# Patient Record
Sex: FEMALE | Race: WHITE | URBAN - METROPOLITAN AREA
[De-identification: names, ages, dates, MRNs, and addresses within clinical notes are randomized per-mention and may not be internally consistent; named-entity substitution may affect disease eponyms.]

---

## 2022-11-15 ENCOUNTER — TELEPHONE (OUTPATIENT)
Dept: ENDOCRINOLOGY | Age: 62
End: 2022-11-15

## 2022-12-05 ENCOUNTER — TELEPHONE (OUTPATIENT)
Dept: ENDOCRINOLOGY | Age: 62
End: 2022-12-05

## 2022-12-15 ENCOUNTER — TELEPHONE (OUTPATIENT)
Dept: ENDOCRINOLOGY | Age: 62
End: 2022-12-15

## 2022-12-20 NOTE — TELEPHONE ENCOUNTER
Patient is scheduled for a NP appointment with Dr. Chris Rosado on 2/22/23 @ 9:00 am.      Patient has been advised to bring any vitamins and/or supplements to the appointment.

## 2023-02-22 ENCOUNTER — OFFICE VISIT (OUTPATIENT)
Dept: ENDOCRINOLOGY | Age: 63
End: 2023-02-22
Payer: COMMERCIAL

## 2023-02-22 VITALS
SYSTOLIC BLOOD PRESSURE: 125 MMHG | WEIGHT: 120.6 LBS | HEIGHT: 65 IN | DIASTOLIC BLOOD PRESSURE: 79 MMHG | BODY MASS INDEX: 20.09 KG/M2

## 2023-02-22 DIAGNOSIS — M81.0 OSTEOPOROSIS, POSTMENOPAUSAL: Primary | ICD-10-CM

## 2023-02-22 PROCEDURE — 99205 OFFICE O/P NEW HI 60 MIN: CPT | Performed by: INTERNAL MEDICINE

## 2023-02-22 RX ORDER — MULTIVITAMIN
1 TABLET ORAL DAILY
COMMUNITY

## 2023-02-22 RX ORDER — CALCIUM CARBONATE 500(1250)
TABLET ORAL DAILY
COMMUNITY

## 2023-02-22 RX ORDER — CALCIUM CARBONATE/VITAMIN D3 500-10/5ML
1 LIQUID (ML) ORAL DAILY
COMMUNITY

## 2023-02-22 NOTE — PROGRESS NOTES
[facility] ([facility]) Osteoporosis and [address] [facility]  [facility] [address] [facility], [address],[place] [address]  Phone [phone]  Fax [phone]    NAME:  Robby Galvin  :  1960  CONSULT DATE:    2023  MOST RECENT VISIT:  2023  TODAY'S DATE:  2023    Labs @ [facility] 2022    CONSULTATION REQUESTED BY: Jaime Pederson MD, fax [phone]  OTHERS WHO NEED REPORTS: Dayanna Edwards MD, fax [phone]    PROBLEMS. Osteoporosis by DXA 2016, T-scores -2.6 in the spine, -2.0 in the left femoral neck    Family history of osteoporosis, two sisters; maternal aunt with vertebral fractures. BMD decreased 1505-3094, T-scores -2.7 in the spine, -2.6 in the left total hip  Natural menopause age 47  Dental implant  or before    CURRENT MANAGEMENT FOR BONE HEALTH/OSTEOPOROSIS. Calcium, 300 mg from low calcium foods, 450 mg almond milk, some yogurt   diet MVI Ca+D other    Calcium 750+ 220 1000  mg/d   Vitamin D  9093 004 9382 IU/d     25-OH D 63 ng/mL 2022 (desirable is 30-60 ng/mL)  Exercise, walks 60+ minutes daily  Pharmacologic therapy: none    PREVIOUS BONE-ACTIVE MEDICATIONS. none    OTHER CURRENT MEDICATIONS (SELECTED): none  OTC MEDICATIONS (SELECTED): magnesium oxide, magnesium glycinate    CHIEF COMPLAINT. Concern DXA scan results    HISTORY OF PRESENT ILLNESS: See problem list for chronic/inactive conditions. Ms. Lacy Malin is a 69-year-old woman who was found to have osteoporosis by DXA in 2016. FOR FULL DETAILS OF FAMILY HISTORY, PAST MEDICAL AND SURGICAL HISTORY, SOCIAL HISTORY, AND REVIEW OF SYSTEMS, SEE PATIENT QUESTIONNAIRE OF TODAY'S DATE. FAMILY HISTORY. Relevant hx in problem list and/or HPI. Otherwise not contributory. PAST MEDICAL HISTORY. Noted in health history form. PAST SURGICAL HISTORY. Noted in health history form. SOCIAL HISTORY. Nonsmoker. No excessive intake of alcohol, caffeine or sodas. Lives with her spouse. REVIEW OF SYSTEMS. Maximum adult height 115. No significant height loss. Usual weight 66#. No recent significant change in weight. PHYSICAL EXAMINATION. GENERAL. Well-nourished, well-developed, normally proportioned adult. MENTAL STATUS. Pleasant mood. Oriented to time, place, and person. ORAL. Teeth appear to be in good condition. SKIN. Normal texture and turgor. LUNGS. Clear to auscultation. Breath sounds normal.  HEART. Heart sounds normal, no murmur or gallop. MUSCULOSKELETAL. The examination included inspection/palpation (any misalignment, asymmetry, crepitation, defects, tenderness, masses, effusions is noted), assessment of range of motion (any presence of pain, crepitation, contracture is noted), assessment of stability (any dislocation, subluxation, laxity is noted), assessment of muscle strength and tone (any atrophy or abnormal movements is noted). Pelvis appears normal.  Spinal contours are normal.  No spine tenderness to palpation or percussion. Three finger spaces between ribs and pelvis. Gait steady without assistance. NEUROLOGICAL. Able to rise from chair without using arms. No obvious motor or sensory deficit. Coordination appears normal     BONE DENSITY. Most recent and 2019 done at Dr. Drummond [facility] using [address] [place] [address] & [email or web address] equipment. 2016 done at [address] [place] [address] with [employer] equipment. T-scores   Initial study: 11/03/2016 L1-L4 -2.6 left fem. neck -2.0   Current study: 05/31/2022* L1-L4 -2.7 left total hip -2.6     The table below shows bone mineral density (grams/cm2), the appropriate measure for comparing serial scans. A significant increase or decrease is based on precision studies done at our center according to the ISCD protocol with a least significant change of 0.030 g/cm2. PA spine Proximal Femur (left)   Date L1-L4  Fem. neck Trochanter Total hip   11/03/2016 0.762 0.627 0.551 0.693   09/16/2019* 0.757 0.601 0.485 0.664   05/31/2022* 0.744 0.568 --- 0.619   *Done with [employer] equipment.   BMD converted to [employer] units. Labs: 06/2022 Ca 9.9 Cr 0.8  Imaging: DXA printouts reviewed. ASSESSMENT. Osteoporosis, bone density lower than desirable and decreasing 3533-3041 and further 7904-9278. Using FRAX, the 10-year fracture risk is below the intervention thresholds now, but hip fracture risk could be at or above the intervention threshold in about 5 years, sooner if bone density decreases. Pharmacologic treatment to reduce fracture risk is optional now. DIAGNOSTIC PLANS. Blood tests: In 2 weeks (or longer), on appropriate calcium intake,  24-hour urine for calcium, creatinine and sodium. I will be in touch with the patient to review lab results. THERAPEUTIC PLANS. Calcium, target 1200 mg daily; we discussed recommended calcium intake and the effects of excessive calcium intake from supplements. I provided a handout with information about how to calculate daily calcium intake. Advised to reduce calcium supplement to 200-300 mg/d. Vitamin D, current vitamin D intake is fine. Exercise, current exercise program is fine. Pharmacologic therapy, we discussed long-term treatment options for osteoporosis that have evidence for broad spectrum anti-fracture efficacy (reduce the risk of vertebral, hip and other nonvertebral fractures); alendronate (Fosamax), zoledronate (Reclast) and Prolia (denosumab). I explained dosing instructions, the mechanism of action, side effects (which are uncommon) and safety concerns (which are rare). I would also consider risedronate (Actonel) but it usually much more expensive than alendronate. I explained that a [place] of 1-3 years may be possible after 3 years of Reclast or 5 years of alendronate, but holidays are not appropriate with Prolia, which needs regular 6 months dosing to provide protection. She elected to defer pharmacologic therapy for now. Recommend repeat DXA in 2-3 years. Total time on the date the encounter was 60-74 minutes.       Dilcia Traore Skyler Urias MD, Director, [facility]) Osteoporosis and Bone Health Services    CC:  Ariane Bansal MD, fax [phone]  Dennys Erazo MD, fax [phone]

## 2023-02-22 NOTE — LETTER
[address] [facility] and Osteoporosis  [facility] [address] [facility], [address] [place] [address],[place] [address]  Phone [phone]  Fax [phone]         2023         Rosa Nava MD, fax [phone]                            Re:  Siri Khan,  1960    Dear  [facility]: Thank you for asking me to see Fuadlissy Bill in consultation. As you know, Ms. Nanda Wolf is a 60 y.o. woman found to have osteoporosis in 2016. BMD decreased 2438-9021 (T-scores -2.7 in the spine, -2.6 in the left total hip) but no more than expected for age, race and sex. We reviewed life-style issues (calcium, vitamin D and physical activity). Using FRAX, her 10-year fracture risk is below the intervention thresholds now, but hip  fracture risk could be at or above the intervention threshold in about 5 years, sooner if bone density decreases. Pharmacologic therapy to reduce fracture risk is optional. She prefers to wait until later. I recommended repeating her bone density test in 2-3 years. Enclosed is a copy of my consultation note. Please let me know if you have any questions. Sincerely,    Svetlana Diamond. [email or web address]     Encl.  Copy of consult note      CC:    Jose Winter DO, fax [phone]